# Patient Record
Sex: MALE | Race: WHITE
[De-identification: names, ages, dates, MRNs, and addresses within clinical notes are randomized per-mention and may not be internally consistent; named-entity substitution may affect disease eponyms.]

---

## 2020-01-08 ENCOUNTER — HOSPITAL ENCOUNTER (INPATIENT)
Dept: HOSPITAL 19 - COL.ER | Age: 60
LOS: 6 days | Discharge: HOME | DRG: 921 | End: 2020-01-14
Attending: SURGERY | Admitting: SURGERY
Payer: COMMERCIAL

## 2020-01-08 VITALS — SYSTOLIC BLOOD PRESSURE: 129 MMHG | DIASTOLIC BLOOD PRESSURE: 75 MMHG | HEART RATE: 59 BPM | TEMPERATURE: 98.1 F

## 2020-01-08 VITALS — HEART RATE: 56 BPM | SYSTOLIC BLOOD PRESSURE: 114 MMHG | TEMPERATURE: 98 F | DIASTOLIC BLOOD PRESSURE: 61 MMHG

## 2020-01-08 VITALS — WEIGHT: 299.39 LBS | HEIGHT: 70.98 IN | BODY MASS INDEX: 41.91 KG/M2

## 2020-01-08 DIAGNOSIS — Z79.52: ICD-10-CM
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
## 2020-01-08 DIAGNOSIS — I10: ICD-10-CM

## 2020-01-08 DIAGNOSIS — E03.9: ICD-10-CM

## 2020-01-08 DIAGNOSIS — T81.30XA: Primary | ICD-10-CM

## 2020-01-08 DIAGNOSIS — M10.9: ICD-10-CM

## 2020-01-08 LAB
ALBUMIN SERPL-MCNC: 4 GM/DL (ref 3.5–5)
ALP SERPL-CCNC: 80 U/L (ref 50–136)
ALT SERPL-CCNC: 22 U/L (ref 21–72)
ANION GAP SERPL CALC-SCNC: 6 MMOL/L (ref 7–16)
AST SERPL-CCNC: 62 U/L (ref 15–37)
BASOPHILS # BLD: 0.1 10*3/UL (ref 0–0.2)
BASOPHILS NFR BLD AUTO: 0.5 % (ref 0–2)
BILIRUB SERPL-MCNC: 0.5 MG/DL (ref 0–1)
BUN SERPL-MCNC: 24 MG/DL (ref 9–20)
CALCIUM SERPL-MCNC: 8.7 MG/DL (ref 8.4–10.2)
CHLORIDE SERPL-SCNC: 108 MMOL/L (ref 98–107)
CO2 SERPL-SCNC: 28 MMOL/L (ref 22–30)
CREAT SERPL-SCNC: 1.13 UMOL/L (ref 0.66–1.25)
CRP SERPL-MCNC: 0.7 MG/DL (ref 0–0.9)
EOSINOPHIL # BLD: 0.5 10*3/UL (ref 0–0.7)
EOSINOPHIL NFR BLD: 4.2 % (ref 0–4)
ERYTHROCYTE [DISTWIDTH] IN BLOOD BY AUTOMATED COUNT: 15.1 % (ref 11.5–14.5)
GLUCOSE SERPL-MCNC: 118 MG/DL (ref 74–106)
GRANULOCYTES # BLD AUTO: 78.7 % (ref 42.2–75.2)
HCT VFR BLD AUTO: 41.2 % (ref 42–52)
HGB BLD-MCNC: 12.9 G/DL (ref 13.5–18)
LYMPHOCYTES # BLD: 1.1 10*3/UL (ref 1.2–3.4)
LYMPHOCYTES NFR BLD: 9.7 % (ref 20–51)
MCH RBC QN AUTO: 28 PG (ref 27–31)
MCHC RBC AUTO-ENTMCNC: 31 G/DL (ref 33–37)
MCV RBC AUTO: 89 FL (ref 80–100)
MONOCYTES # BLD: 0.7 10*3/UL (ref 0.1–0.6)
MONOCYTES NFR BLD AUTO: 6.4 % (ref 1.7–9.3)
NEUTROPHILS # BLD: 9 10*3/UL (ref 1.4–6.5)
PH UR STRIP.AUTO: 5 [PH] (ref 5–8)
PLATELET # BLD AUTO: 274 K/MM3 (ref 130–400)
PMV BLD AUTO: 10.3 FL (ref 7.4–10.4)
POTASSIUM SERPL-SCNC: 4.1 MMOL/L (ref 3.4–5)
PROT SERPL-MCNC: 6.8 GM/DL (ref 6.4–8.2)
RBC # BLD AUTO: 4.62 M/MM3 (ref 4.2–5.6)
RBC # UR: (no result) /HPF
SODIUM SERPL-SCNC: 142 MMOL/L (ref 137–145)
SP GR UR STRIP.AUTO: 1.02 (ref 1–1.03)
SQUAMOUS # URNS: (no result) /HPF
URN COLLECT METHOD CLASS: (no result)
UROBILINOGEN UR STRIP.AUTO-MCNC: 2 MG/DL

## 2020-01-08 PROCEDURE — A6550 NEG PRES WOUND THER DRSG SET: HCPCS

## 2020-01-08 NOTE — NUR
PATIENT RESTING IN BED DURING SHIFT CHANGE REPORT FROM DAY SHIFT NURSE.  IVF
INFUSING WITH NO PROBLEMS.

## 2020-01-09 VITALS — TEMPERATURE: 98 F | SYSTOLIC BLOOD PRESSURE: 117 MMHG | HEART RATE: 61 BPM | DIASTOLIC BLOOD PRESSURE: 74 MMHG

## 2020-01-09 VITALS — HEART RATE: 66 BPM | SYSTOLIC BLOOD PRESSURE: 123 MMHG | DIASTOLIC BLOOD PRESSURE: 79 MMHG | TEMPERATURE: 97.9 F

## 2020-01-09 VITALS — TEMPERATURE: 97.8 F | DIASTOLIC BLOOD PRESSURE: 57 MMHG | HEART RATE: 60 BPM | SYSTOLIC BLOOD PRESSURE: 103 MMHG

## 2020-01-09 VITALS — SYSTOLIC BLOOD PRESSURE: 116 MMHG | DIASTOLIC BLOOD PRESSURE: 75 MMHG | HEART RATE: 57 BPM

## 2020-01-09 VITALS — DIASTOLIC BLOOD PRESSURE: 73 MMHG | HEART RATE: 60 BPM | SYSTOLIC BLOOD PRESSURE: 127 MMHG | TEMPERATURE: 98.3 F

## 2020-01-09 VITALS — DIASTOLIC BLOOD PRESSURE: 70 MMHG | SYSTOLIC BLOOD PRESSURE: 126 MMHG | TEMPERATURE: 98 F | HEART RATE: 73 BPM

## 2020-01-09 VITALS — SYSTOLIC BLOOD PRESSURE: 116 MMHG | HEART RATE: 57 BPM | DIASTOLIC BLOOD PRESSURE: 74 MMHG

## 2020-01-09 VITALS — SYSTOLIC BLOOD PRESSURE: 105 MMHG | TEMPERATURE: 98.1 F | DIASTOLIC BLOOD PRESSURE: 68 MMHG | HEART RATE: 65 BPM

## 2020-01-09 VITALS — HEART RATE: 65 BPM | DIASTOLIC BLOOD PRESSURE: 69 MMHG | SYSTOLIC BLOOD PRESSURE: 104 MMHG

## 2020-01-09 VITALS — DIASTOLIC BLOOD PRESSURE: 71 MMHG | SYSTOLIC BLOOD PRESSURE: 118 MMHG | HEART RATE: 91 BPM

## 2020-01-09 VITALS — SYSTOLIC BLOOD PRESSURE: 114 MMHG | DIASTOLIC BLOOD PRESSURE: 77 MMHG | HEART RATE: 53 BPM

## 2020-01-09 VITALS — SYSTOLIC BLOOD PRESSURE: 123 MMHG | HEART RATE: 72 BPM | DIASTOLIC BLOOD PRESSURE: 83 MMHG

## 2020-01-09 VITALS — HEART RATE: 91 BPM | SYSTOLIC BLOOD PRESSURE: 118 MMHG | DIASTOLIC BLOOD PRESSURE: 71 MMHG

## 2020-01-09 VITALS — DIASTOLIC BLOOD PRESSURE: 69 MMHG | SYSTOLIC BLOOD PRESSURE: 115 MMHG | HEART RATE: 75 BPM

## 2020-01-09 VITALS — DIASTOLIC BLOOD PRESSURE: 72 MMHG | HEART RATE: 72 BPM | SYSTOLIC BLOOD PRESSURE: 121 MMHG

## 2020-01-09 VITALS — HEART RATE: 60 BPM | DIASTOLIC BLOOD PRESSURE: 68 MMHG | SYSTOLIC BLOOD PRESSURE: 122 MMHG

## 2020-01-09 LAB
ANION GAP SERPL CALC-SCNC: 4 MMOL/L (ref 7–16)
BASOPHILS # BLD: 0.1 10*3/UL (ref 0–0.2)
BASOPHILS NFR BLD AUTO: 0.9 % (ref 0–2)
BUN SERPL-MCNC: 20 MG/DL (ref 9–20)
CALCIUM SERPL-MCNC: 8.2 MG/DL (ref 8.4–10.2)
CHLORIDE SERPL-SCNC: 108 MMOL/L (ref 98–107)
CO2 SERPL-SCNC: 29 MMOL/L (ref 22–30)
CREAT SERPL-SCNC: 1.08 UMOL/L (ref 0.66–1.25)
EOSINOPHIL # BLD: 0.6 10*3/UL (ref 0–0.7)
EOSINOPHIL NFR BLD: 6.7 % (ref 0–4)
ERYTHROCYTE [DISTWIDTH] IN BLOOD BY AUTOMATED COUNT: 14.9 % (ref 11.5–14.5)
GLUCOSE SERPL-MCNC: 86 MG/DL (ref 74–106)
GRANULOCYTES # BLD AUTO: 65.2 % (ref 42.2–75.2)
HCT VFR BLD AUTO: 36.8 % (ref 42–52)
HGB BLD-MCNC: 11.2 G/DL (ref 13.5–18)
LYMPHOCYTES # BLD: 1.6 10*3/UL (ref 1.2–3.4)
LYMPHOCYTES NFR BLD: 16.9 % (ref 20–51)
MCH RBC QN AUTO: 28 PG (ref 27–31)
MCHC RBC AUTO-ENTMCNC: 30 G/DL (ref 33–37)
MCV RBC AUTO: 90 FL (ref 80–100)
MONOCYTES # BLD: 0.9 10*3/UL (ref 0.1–0.6)
MONOCYTES NFR BLD AUTO: 9.7 % (ref 1.7–9.3)
NEUTROPHILS # BLD: 6.1 10*3/UL (ref 1.4–6.5)
PLATELET # BLD AUTO: 243 K/MM3 (ref 130–400)
PMV BLD AUTO: 10.2 FL (ref 7.4–10.4)
POTASSIUM SERPL-SCNC: 4 MMOL/L (ref 3.4–5)
RBC # BLD AUTO: 4.08 M/MM3 (ref 4.2–5.6)
SODIUM SERPL-SCNC: 142 MMOL/L (ref 137–145)

## 2020-01-09 PROCEDURE — 2W13X6Z COMPRESSION OF ABDOMINAL WALL USING PRESSURE DRESSING: ICD-10-PCS | Performed by: SURGERY

## 2020-01-09 NOTE — NUR
Dressing change to midline incision. 11 cm x 3cm wound. Deepest part of wound
is 4cm. Wet to dry gauze dressing applied.

## 2020-01-09 NOTE — NUR
Patient resting in bed during shift change report given to day shift nurse.
IVF infusing without problems.  When asked, patient reported had abd sx in
[place] at [facility].

## 2020-01-09 NOTE — NUR
Patient in bed resting. Alert and oriented x 3. Shift assessment complete.
Midline incision with gauze dressing in place. Fluids infusing per orders to
right AC IV. Denies further needs at this time. Patient will be NPO after
breakfast. Independent in room. Denies pain at this time.

## 2020-01-09 NOTE — NUR
PATIENT RESTING WITH EYES CLOSED, DOES NOT AWAKEN WHEN DOOR TO ROOM OPENED.
BREATHING NONLABORED AND EVEN. IVF INFUSING WITH NO PROBLEMS.

## 2020-01-09 NOTE — NUR
Patient in bed resting. Alert and orineted x 3. Shift assessment complete.
Ang to dependent drainage with cherry red urine present in bag. CBI infusing
at slow rate. Patient denies pain at this time. Denies further needs at this
time.

## 2020-01-09 NOTE — NUR
SW met with the patient to discuss discharge plan. The patient lives alone in
[place]. He states that his brother, Sterling, (ph#[phone]) lives nearby.
He reports independence with ADLs and has a walker available, if needed. The
patient's PCP is Dr. Amaya Ward and he receives his medications at [facility]. He reports no difficulties obtaining his meds. The patient does not
have advanced directives and he was not interested in completing them at this
time. He states that he has five siblings and that his next of kin and person
to contact is his brother, Sterling. The patient plans to return home upon
discharge. The patient may possibly have a wound vac placed. SW to continue to
follow. Warm

## 2020-01-09 NOTE — NUR
Patient states he took off BP cuff attempting to get out of bed to go to
bathroom and did not put BP cuff and oximeter probe back on finger to left
hand.  Voided without problem. Continues to deny any pain currently to abd.

## 2020-01-09 NOTE — NUR
Patient returned to floor from PACU via bed. Patient is alert and oriented,
denies pain. wound vac in place, patient denies needs at this time, call light
within reach.

## 2020-01-10 VITALS — HEART RATE: 64 BPM | TEMPERATURE: 98.2 F | DIASTOLIC BLOOD PRESSURE: 61 MMHG | SYSTOLIC BLOOD PRESSURE: 104 MMHG

## 2020-01-10 VITALS — HEART RATE: 56 BPM | DIASTOLIC BLOOD PRESSURE: 66 MMHG | SYSTOLIC BLOOD PRESSURE: 111 MMHG | TEMPERATURE: 98.6 F

## 2020-01-10 VITALS — TEMPERATURE: 97.9 F | DIASTOLIC BLOOD PRESSURE: 65 MMHG | SYSTOLIC BLOOD PRESSURE: 111 MMHG | HEART RATE: 62 BPM

## 2020-01-10 VITALS — TEMPERATURE: 97.8 F | HEART RATE: 60 BPM | SYSTOLIC BLOOD PRESSURE: 103 MMHG | DIASTOLIC BLOOD PRESSURE: 57 MMHG

## 2020-01-10 VITALS — SYSTOLIC BLOOD PRESSURE: 110 MMHG | DIASTOLIC BLOOD PRESSURE: 74 MMHG | TEMPERATURE: 98.2 F | HEART RATE: 70 BPM

## 2020-01-10 VITALS — SYSTOLIC BLOOD PRESSURE: 121 MMHG | DIASTOLIC BLOOD PRESSURE: 78 MMHG | TEMPERATURE: 98.2 F | HEART RATE: 65 BPM

## 2020-01-10 VITALS — TEMPERATURE: 98.3 F | DIASTOLIC BLOOD PRESSURE: 82 MMHG | HEART RATE: 65 BPM | SYSTOLIC BLOOD PRESSURE: 125 MMHG

## 2020-01-10 NOTE — NUR
Patient has been doing well today. He is independent in the room. His dressing
has been intact without any issues. Has a good seal. Patient is going back to
have a second I&D tomorrow. He is NPO after midnight. His IV started leaking
this evening. New IV site started to right forearm. He had 100ml pink drainage
from his wound-vac. No other changes at this time. Call light within reach.

## 2020-01-10 NOTE — NUR
PATIENT RESTING IN BED WITH NO COMPLAINTS CURRENTLY.  REPORTS UNABLE TO GO
BACK TO SLEEP WHEN HE WAS WOKEN FOR VITALS.

## 2020-01-10 NOTE — NUR
PATIENT RESTING IN BED, FINISHED EATING BREAKFAST, DURING SHIFT CHANGE REPORT
GIVEN TO DAY SHIFT NURSE.  REPORTS PASSING FLATUS.  NO OTHER NEEDS REPORTED.

## 2020-01-10 NOTE — NUR
The patient is to tentatively need an outpatient wound VAC upon discharge. SELENA
submitted a wound VAC order to [employer]. SELENA faxed the patient's H&P and
progress notes to [employer] (fax#[phone]). SELENA awaiting insurance
approval and will continue to follow.

## 2020-01-10 NOTE — NUR
SHIFT REPORT RECEIVED FROM DANIKA LOWE. PT RESTING IN BED. WOUND VAC
SECURE AND WITHOUT ANY LEAKS. HAS PINK SEROUS DRAINAGE IN CANNISTER.  PT
DENIES PAIN AT THIS TIME.  REVIEWED PLAN FOR I&D PROCEDURE TOMORROW. PT DENIES
QUESTIONS. DENIES NEEDS. CALL LIGHT IN REACH. REFUSES SCD'S. PT UP AND ABOUT
IN ROOM INDEPENDENTLY. WOUND VAC PLACED ON IV POLE.

## 2020-01-11 VITALS — DIASTOLIC BLOOD PRESSURE: 63 MMHG | HEART RATE: 56 BPM | SYSTOLIC BLOOD PRESSURE: 109 MMHG | TEMPERATURE: 98 F

## 2020-01-11 VITALS — TEMPERATURE: 98.3 F | DIASTOLIC BLOOD PRESSURE: 85 MMHG | SYSTOLIC BLOOD PRESSURE: 149 MMHG | HEART RATE: 64 BPM

## 2020-01-11 VITALS — DIASTOLIC BLOOD PRESSURE: 83 MMHG | HEART RATE: 58 BPM | TEMPERATURE: 98.2 F | SYSTOLIC BLOOD PRESSURE: 131 MMHG

## 2020-01-11 VITALS — HEART RATE: 58 BPM | SYSTOLIC BLOOD PRESSURE: 99 MMHG | DIASTOLIC BLOOD PRESSURE: 51 MMHG

## 2020-01-11 VITALS — TEMPERATURE: 98 F | SYSTOLIC BLOOD PRESSURE: 111 MMHG | DIASTOLIC BLOOD PRESSURE: 46 MMHG | HEART RATE: 60 BPM

## 2020-01-11 VITALS — HEART RATE: 69 BPM | TEMPERATURE: 98 F | DIASTOLIC BLOOD PRESSURE: 80 MMHG | SYSTOLIC BLOOD PRESSURE: 117 MMHG

## 2020-01-11 VITALS — HEART RATE: 65 BPM | TEMPERATURE: 98 F | DIASTOLIC BLOOD PRESSURE: 72 MMHG | SYSTOLIC BLOOD PRESSURE: 94 MMHG

## 2020-01-11 VITALS — SYSTOLIC BLOOD PRESSURE: 123 MMHG | HEART RATE: 70 BPM | DIASTOLIC BLOOD PRESSURE: 71 MMHG

## 2020-01-11 VITALS — DIASTOLIC BLOOD PRESSURE: 66 MMHG | HEART RATE: 59 BPM | SYSTOLIC BLOOD PRESSURE: 105 MMHG

## 2020-01-11 VITALS — TEMPERATURE: 98.1 F | DIASTOLIC BLOOD PRESSURE: 75 MMHG | SYSTOLIC BLOOD PRESSURE: 112 MMHG | HEART RATE: 66 BPM

## 2020-01-11 VITALS — HEART RATE: 64 BPM | TEMPERATURE: 98.6 F | DIASTOLIC BLOOD PRESSURE: 65 MMHG | SYSTOLIC BLOOD PRESSURE: 113 MMHG

## 2020-01-11 PROCEDURE — 2W03X6Z CHANGE PRESSURE DRESSING ON ABDOMINAL WALL: ICD-10-PCS | Performed by: SURGERY

## 2020-01-11 NOTE — NUR
Patient has done well throughout the day. Minimal needs. Wound vac with serous
drainage present. Continues to deny pain. Independent in room. Denies further
needs at this time. Will report off to night shift.

## 2020-01-11 NOTE — NUR
PT SLEEPING BUT WAKES EASILY.  ABT STARTED TO RT ARM IV. SITE CLEAR. DENIES
NEEDS AT THIS TIME.  WOUND VAC INTACT AND WORKING PROPERLY. DILUTE PINK DRG
NOTED. NO LEAKS.

## 2020-01-11 NOTE — NUR
Patient in bed resting. Alert and oriented x 3. Assessment complete. Wound VAC
to midline wound, minimal serous drainage noted in canister. Consent signed,
preop fluids per orders. Patient denies pain or further needs at this time.
Patient down by bed to OR.

## 2020-01-12 VITALS — DIASTOLIC BLOOD PRESSURE: 83 MMHG | TEMPERATURE: 98.8 F | HEART RATE: 62 BPM | SYSTOLIC BLOOD PRESSURE: 128 MMHG

## 2020-01-12 VITALS — HEART RATE: 64 BPM | TEMPERATURE: 98.8 F | DIASTOLIC BLOOD PRESSURE: 72 MMHG | SYSTOLIC BLOOD PRESSURE: 132 MMHG

## 2020-01-12 VITALS — DIASTOLIC BLOOD PRESSURE: 44 MMHG | SYSTOLIC BLOOD PRESSURE: 119 MMHG | HEART RATE: 62 BPM | TEMPERATURE: 98.3 F

## 2020-01-12 VITALS — HEART RATE: 61 BPM | DIASTOLIC BLOOD PRESSURE: 80 MMHG | SYSTOLIC BLOOD PRESSURE: 134 MMHG | TEMPERATURE: 98.1 F

## 2020-01-12 VITALS — HEART RATE: 68 BPM | DIASTOLIC BLOOD PRESSURE: 90 MMHG | SYSTOLIC BLOOD PRESSURE: 123 MMHG | TEMPERATURE: 98.8 F

## 2020-01-12 NOTE — NUR
Bedside report to YENNY Flores. Pt indep in rm w/ gripper socks in place, gown,
continent, wears own underwear, wound vac in place with 125 mmHG cont suction.
Pt states he had a BM this alcon. Denies pain, no further needs.

## 2020-01-12 NOTE — NUR
Report from YENNY Cervantes. Pt in bed, finished with breakfast, wound vac to 124
mmHG cont suction, marked irregular level in canister with marker, pt A&O,
talkative, took pills whole with water, given oj/aj/prune juice cocktail for
constipation. INT flushes w/o complications. Pleasant, has cell phone and call
lt in reach.

## 2020-01-12 NOTE — NUR
PT SLEEPING NOW.  WOUND VAC CONTINUES W/O LEAKS. APPEARS TO HAVE CLEAR
GLOBULES WITH PINK SEROUS DRG. NOT QUITE 100CC IN DRAINAGE CANNISTER.

## 2020-01-12 NOTE — NUR
Pt doing very well, Sitting up in bed talking. Alert and oriented with VSS.
denies pain at this time. Has wound vac to 125mmhg suction, CD&I. Reports he
had a BM this evening. Denies needs at this time. Call light within reach,
will continue to monitor

## 2020-01-12 NOTE — NUR
Pt's wound vac and dressing intact. Amb third floor with SBA. Developed skin
tear to right pinky toe, applied steri strips, cotton ball piece and bandaid.
Provided new gripper socks.

## 2020-01-12 NOTE — NUR
Per Dr. Garibay, pt is ok to shower if keeps dressing dry, will cover with
celphane and tape, keep wc cord loop lower than machine. Made  aware of
progress note to transition to oral Abx and pt still on IV abx, see new
orders.

## 2020-01-13 VITALS — HEART RATE: 58 BPM | TEMPERATURE: 98 F | DIASTOLIC BLOOD PRESSURE: 61 MMHG | SYSTOLIC BLOOD PRESSURE: 112 MMHG

## 2020-01-13 VITALS — TEMPERATURE: 98.1 F | SYSTOLIC BLOOD PRESSURE: 116 MMHG | HEART RATE: 60 BPM | DIASTOLIC BLOOD PRESSURE: 70 MMHG

## 2020-01-13 VITALS — TEMPERATURE: 97.8 F | HEART RATE: 57 BPM | DIASTOLIC BLOOD PRESSURE: 69 MMHG | SYSTOLIC BLOOD PRESSURE: 115 MMHG

## 2020-01-13 VITALS — DIASTOLIC BLOOD PRESSURE: 84 MMHG | HEART RATE: 63 BPM | TEMPERATURE: 98.1 F | SYSTOLIC BLOOD PRESSURE: 124 MMHG

## 2020-01-13 VITALS — TEMPERATURE: 97.7 F | HEART RATE: 57 BPM | DIASTOLIC BLOOD PRESSURE: 40 MMHG | SYSTOLIC BLOOD PRESSURE: 113 MMHG

## 2020-01-13 VITALS — TEMPERATURE: 97.8 F | DIASTOLIC BLOOD PRESSURE: 81 MMHG | HEART RATE: 55 BPM | SYSTOLIC BLOOD PRESSURE: 123 MMHG

## 2020-01-13 NOTE — NUR
Sony, with [employer], reports that they have officially submitted auth to
the patient's insurance, [employer]. He states that they are just waiting on a
response from [employer] now. SW to continue to follow.

## 2020-01-13 NOTE — NUR
Patient alert and oriented, answers questions appropriately.  See assessment.
Wound vac to abdomen, compressed, bloody drainage noted in canister.  No c/o
pain or discomfort.

## 2020-01-13 NOTE — NUR
[employer] is needing the V.A.C. Therapy Insurance Authorization Form signed
and dated by the ordering physician. Dr. Hickman signed form and SELENA faxed the
form back to [facility] [employer] (fax#[phone]). SELENA attempted to contact [facility] reps:
Sony Cates and [employer]. SELENA left them voicemails. SELENA awaiting approval from
insurance for the wound VAC and will continue to follow.

## 2020-01-14 VITALS — HEART RATE: 75 BPM | TEMPERATURE: 98 F | SYSTOLIC BLOOD PRESSURE: 119 MMHG | DIASTOLIC BLOOD PRESSURE: 60 MMHG

## 2020-01-14 VITALS — HEART RATE: 80 BPM | TEMPERATURE: 98.2 F | SYSTOLIC BLOOD PRESSURE: 125 MMHG | DIASTOLIC BLOOD PRESSURE: 73 MMHG

## 2020-01-14 VITALS — HEART RATE: 58 BPM | TEMPERATURE: 98.1 F | SYSTOLIC BLOOD PRESSURE: 118 MMHG | DIASTOLIC BLOOD PRESSURE: 82 MMHG

## 2020-01-14 VITALS — DIASTOLIC BLOOD PRESSURE: 65 MMHG | TEMPERATURE: 98.1 F | SYSTOLIC BLOOD PRESSURE: 92 MMHG | HEART RATE: 87 BPM

## 2020-01-14 NOTE — NUR
Sony, with [employer], reports that the patient was approved for a wound VAC.
SELENA notified House Supervisor and the patient's RN. House Supervisor delivered
the wound VAC to SELENA and his RN. SELENA met with the patient to update and to
present and explain the Proof of Delivery/Assignment of Benefits Statement.
The patient verbalized understanding, signed, and he was provided with the
patient copy. SELENA faxed the signed Proof of Delivery/Assignment of Benefits
Statement form to [employer] The patient is to discharge back home today,
1/14. No additional needs at this time.

## 2020-01-14 NOTE — NUR
SEE MORNING SHIFT ASSESSMENT. ABDOMINAL WOUND VAC IN PLACE AND CD&I.
CONTINUOUS SUCTION . NO COMPLAINTS OF PAIN. PATIENT DENIES ANY OTHER
NEEDS AT THIS TIME.

## 2020-01-14 NOTE — NUR
PATIENTS INT DISCONTINUED PER PENDING DISCHARGE. TIP INTACT. PATIENT TOLERATED
WELL. PATIENT ABDOMINAL WOUND VAC CHANGED TO HOME PORTABLE WOUND VAC.
DISCHARGE INSTRCUTIONS REVIEWED. QUESTIONS SOUGHT AND ANSWERED. PATIENT
PERSONAL BELONGINGS GATHERED.

## 2020-01-14 NOTE — NUR
PATIENT SITTING UP AT SIDE OF BED FOR BREAKFAST MEAL DURING SHIFT CHANGE
REPORT GIVEN TO DAY SHIFT NURSE.

## 2020-01-14 NOTE — NUR
WOUND VAC CONTINUES, DRAINING LIGHT RED DRNG IN VAC TUBING, MARKED DRAINAGE
LEVEL ON COLLECTION CONTAINER, AT 50ML OUT.

## 2020-01-14 NOTE — NUR
SELENA contacted [facility] Rep, Sony Cates, for an update on the status of auth from
insurance. Sony reports that he has not heard anything from insurance yet and
that they can sometimes take 3-4 days to respond. SELENA updated the patient. The
patient states that he also gave his insurance a call and that the rep that he
spoke to stated that they have expedited the order. SELENA also attempted to
contact [employer]. SELENA is on a waiting list for [employer] to call back. SELENA to
continue to follow.

## 2020-03-21 NOTE — NUR
Patient resting in bed, eating late supper tray of general diet without N/V
during shift change report from day shift nurse.  IVF infusing without
problems.  Patient denies any needs or complaints.  Wound VAC dressing and
intact to abd with VAC therapy at 125 as per DO. Principal Discharge DX:	Tooth pain

## 2020-04-15 ENCOUNTER — HOSPITAL ENCOUNTER (OUTPATIENT)
Dept: HOSPITAL 19 - ZCOL.LAB | Age: 60
End: 2020-04-15
Attending: NURSE PRACTITIONER
Payer: COMMERCIAL

## 2020-04-15 DIAGNOSIS — T81.31XA: Primary | ICD-10-CM

## 2021-02-11 ENCOUNTER — HOSPITAL ENCOUNTER (OUTPATIENT)
Dept: HOSPITAL 19 - ZCOL.LAB | Age: 61
End: 2021-02-11
Attending: SURGERY
Payer: COMMERCIAL

## 2021-02-11 DIAGNOSIS — Z01.89: Primary | ICD-10-CM

## 2021-03-12 ENCOUNTER — HOSPITAL ENCOUNTER (OUTPATIENT)
Dept: HOSPITAL 19 - SDCO | Age: 61
Discharge: HOME | End: 2021-03-12
Attending: SURGERY
Payer: COMMERCIAL

## 2021-03-12 VITALS — SYSTOLIC BLOOD PRESSURE: 112 MMHG | DIASTOLIC BLOOD PRESSURE: 69 MMHG | HEART RATE: 57 BPM

## 2021-03-12 VITALS — HEART RATE: 60 BPM | DIASTOLIC BLOOD PRESSURE: 68 MMHG | SYSTOLIC BLOOD PRESSURE: 118 MMHG

## 2021-03-12 VITALS — SYSTOLIC BLOOD PRESSURE: 126 MMHG | HEART RATE: 64 BPM | TEMPERATURE: 98.3 F | DIASTOLIC BLOOD PRESSURE: 78 MMHG

## 2021-03-12 VITALS — HEART RATE: 71 BPM | DIASTOLIC BLOOD PRESSURE: 89 MMHG | SYSTOLIC BLOOD PRESSURE: 126 MMHG

## 2021-03-12 VITALS — DIASTOLIC BLOOD PRESSURE: 76 MMHG | HEART RATE: 66 BPM | TEMPERATURE: 97.4 F | SYSTOLIC BLOOD PRESSURE: 132 MMHG

## 2021-03-12 VITALS — HEIGHT: 71 IN | WEIGHT: 315 LBS | BODY MASS INDEX: 44.1 KG/M2

## 2021-03-12 DIAGNOSIS — E66.01: ICD-10-CM

## 2021-03-12 DIAGNOSIS — Z79.899: ICD-10-CM

## 2021-03-12 DIAGNOSIS — Z79.890: ICD-10-CM

## 2021-03-12 DIAGNOSIS — K21.9: ICD-10-CM

## 2021-03-12 DIAGNOSIS — Z80.0: ICD-10-CM

## 2021-03-12 DIAGNOSIS — Z80.42: ICD-10-CM

## 2021-03-12 DIAGNOSIS — Z20.822: ICD-10-CM

## 2021-03-12 DIAGNOSIS — M10.9: ICD-10-CM

## 2021-03-12 DIAGNOSIS — L90.5: ICD-10-CM

## 2021-03-12 DIAGNOSIS — K66.0: ICD-10-CM

## 2021-03-12 DIAGNOSIS — K80.10: Primary | ICD-10-CM

## 2021-03-12 DIAGNOSIS — Z98.84: ICD-10-CM

## 2021-03-12 DIAGNOSIS — E03.9: ICD-10-CM

## 2021-03-12 DIAGNOSIS — Z90.89: ICD-10-CM

## 2021-03-12 DIAGNOSIS — L98.8: ICD-10-CM

## 2021-03-12 DIAGNOSIS — I10: ICD-10-CM

## 2021-03-12 NOTE — NUR
The patient was escorted out via wheelchair to a private vehicle by YENNY Pond.
The patient's belongings and discharge paperwork were sent with him. The
patient's brother is present to drive him home.

## 2021-03-12 NOTE — NUR
The patient has finished his food and drink and appeared to tolerate the
activity well. Vital signs appear stable. The patient denies wanting anything
further to eat or drink at this time. Will continue to monitor the patient.

## 2021-03-12 NOTE — NUR
The patient arrived back to [place] [facility] from the recovery room at this time. The
patient appears alert and oriented and denies any pain or nausea at this time.
The patient has 5 bandaids and a gauze dressing to his abdomen that all appear
clean, dry and intact. The patient agrees to try some toast and juice at this
time. Call light is within reach. Will continue to monitor the patient.

## 2021-03-12 NOTE — NUR
Discharge instructions were reviewed with the patient at this time. He
verbalized understanding and has no questions for the nurse at this time. The
patient's IV to his right hand was removed and a pressure dressing was applied
to the site. The patient was instructed to get dressed and notify the staff
when he is ready to be escorted out.

## 2021-03-12 NOTE — NUR
The patient ambulated to the bathroom with the stand by assistance of one
nurse and appeared to tolerate the activity well. The patient voided without
difficulty and voices a desire to be discharged home.

## 2021-04-26 ENCOUNTER — HOSPITAL ENCOUNTER (OUTPATIENT)
Dept: HOSPITAL 19 - COL.RAD | Age: 61
End: 2021-04-26
Attending: SPECIALIST
Payer: COMMERCIAL

## 2021-04-26 DIAGNOSIS — Z98.84: ICD-10-CM

## 2021-04-26 DIAGNOSIS — R11.2: Primary | ICD-10-CM

## 2021-04-26 DIAGNOSIS — R20.8: ICD-10-CM
